# Patient Record
Sex: MALE | Race: WHITE | URBAN - METROPOLITAN AREA
[De-identification: names, ages, dates, MRNs, and addresses within clinical notes are randomized per-mention and may not be internally consistent; named-entity substitution may affect disease eponyms.]

---

## 2023-03-08 ENCOUNTER — OFFICE VISIT (OUTPATIENT)
Dept: URBAN - METROPOLITAN AREA CLINIC 51 | Facility: CLINIC | Age: 73
End: 2023-03-08
Payer: MEDICARE

## 2023-03-08 DIAGNOSIS — H52.4 PRESBYOPIA: ICD-10-CM

## 2023-03-08 DIAGNOSIS — H43.313 VITREOUS MEMBRANES AND STRANDS, BILATERAL: ICD-10-CM

## 2023-03-08 DIAGNOSIS — H40.023 OPEN ANGLE WITH BORDERLINE FINDINGS, HIGH RISK, BILATERAL: Primary | ICD-10-CM

## 2023-03-08 DIAGNOSIS — E11.9 TYPE 2 DIABETES MELLITUS WITHOUT COMPLICATIONS: ICD-10-CM

## 2023-03-08 DIAGNOSIS — Z79.84 LONG TERM (CURRENT) USE OF ORAL ANTIDIABETIC DRUGS: ICD-10-CM

## 2023-03-08 DIAGNOSIS — H11.041 PERIPHERAL PTERYGIUM, STATIONARY, RIGHT EYE: ICD-10-CM

## 2023-03-08 DIAGNOSIS — H25.13 AGE-RELATED NUCLEAR CATARACT, BILATERAL: ICD-10-CM

## 2023-03-08 PROCEDURE — 99204 OFFICE O/P NEW MOD 45 MIN: CPT | Performed by: OPTOMETRIST

## 2023-03-08 PROCEDURE — 92250 FUNDUS PHOTOGRAPHY W/I&R: CPT | Performed by: OPTOMETRIST

## 2023-03-08 PROCEDURE — 92134 CPTRZ OPH DX IMG PST SGM RTA: CPT | Performed by: OPTOMETRIST

## 2023-03-08 ASSESSMENT — VISUAL ACUITY
OD: 20/20
OS: 20/25

## 2023-03-08 ASSESSMENT — KERATOMETRY
OD: 43.88
OS: 43.63

## 2023-03-08 ASSESSMENT — INTRAOCULAR PRESSURE
OD: 15
OS: 15

## 2023-03-08 NOTE — IMPRESSION/PLAN
Impression: Peripheral pterygium, stationary, right eye: H11.041. Plan: Early nasal pterygium OD. No effect on vision today. Recommend UV blocking sunglasses when outdoors and AT's for comfort. Monitor.

## 2023-03-08 NOTE — IMPRESSION/PLAN
Impression: Presbyopia: H52.4. Plan: Demonstrated and reviewed today's refraction vs. current specs with patient. Release new SRx for optimal vision.

## 2023-03-08 NOTE — IMPRESSION/PLAN
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 hx of VFs Plan: Large ONH appearance OD>OS Reviewed today's findings with patient. History of being monitored as suspect x glaucoma. Will bring back to obtain baseline glaucoma diagnostic testing. No signs of glaucoma at this time. Glaucoma is usually diagnosed when the following three criteria are present: elevated intraocular pressure, optic nerve damage, and visual field loss. When some of these criteria are missing, it can be challenging to make the diagnosis of glaucoma. Glaucoma suspects have some characteristics of glaucoma, but not all, and the findings may be very subtle.  

RTC for 24-2c HVF + RNFL/GCA OCT + IOP check

## 2023-03-08 NOTE — IMPRESSION/PLAN
Impression: Type 2 diabetes mellitus without complications: O62.2. Plan: No diabetic retinopathy observed on today's examination. Discussed the importance of annual diabetic eye exams. Discussed with patient importance of good blood sugar control. Recommend continue f/u care for DM control with PCP. Send report to PCP. Monitor annually with MAC OCT + FUNDUS PHOTOS.

## 2023-03-08 NOTE — IMPRESSION/PLAN
Impression: Age-related nuclear cataract, bilateral: H25.13. Plan: Educated patient on condition and findings. Cataracts are mild and not visually significant. No treatment is currently warranted. Patient will monitor vision closely and contact our office with any changes/ worsening in vision. Monitor annually.